# Patient Record
Sex: MALE | Race: WHITE | Employment: UNEMPLOYED | URBAN - METROPOLITAN AREA
[De-identification: names, ages, dates, MRNs, and addresses within clinical notes are randomized per-mention and may not be internally consistent; named-entity substitution may affect disease eponyms.]

---

## 2017-01-30 RX ORDER — ATORVASTATIN CALCIUM 40 MG/1
TABLET, FILM COATED ORAL
Qty: 30 TABLET | Refills: 7 | Status: SHIPPED | OUTPATIENT
Start: 2017-01-30 | End: 2017-07-22

## 2017-05-06 RX ORDER — METFORMIN HYDROCHLORIDE 500 MG/1
TABLET, EXTENDED RELEASE ORAL
Qty: 30 TABLET | Refills: 0 | Status: SHIPPED | OUTPATIENT
Start: 2017-05-06 | End: 2017-06-03

## 2017-05-06 NOTE — TELEPHONE ENCOUNTER
Diabetes Medications: 30 days supply of medication sent to pharmacy per protocol. Due for OV and labs.     Protocol Criteria:  · Appointment scheduled in the past 6 months or the next 3 months  · A1C < 7.5 in the past 6 months  · Creatinine in the past 12

## 2017-05-06 NOTE — TELEPHONE ENCOUNTER
Spouse calling to check status of refills request   This should be sent to Dr Mechelle Sorensen   Pt out of meds  Please advise

## 2017-05-09 ENCOUNTER — OFFICE VISIT (OUTPATIENT)
Dept: INTERNAL MEDICINE CLINIC | Facility: CLINIC | Age: 47
End: 2017-05-09

## 2017-05-09 ENCOUNTER — LAB ENCOUNTER (OUTPATIENT)
Dept: LAB | Age: 47
End: 2017-05-09
Attending: INTERNAL MEDICINE
Payer: COMMERCIAL

## 2017-05-09 VITALS
DIASTOLIC BLOOD PRESSURE: 109 MMHG | HEART RATE: 87 BPM | BODY MASS INDEX: 23.37 KG/M2 | SYSTOLIC BLOOD PRESSURE: 151 MMHG | TEMPERATURE: 97 F | WEIGHT: 142 LBS | HEIGHT: 65.5 IN

## 2017-05-09 DIAGNOSIS — R10.32 LEFT LOWER QUADRANT PAIN: ICD-10-CM

## 2017-05-09 DIAGNOSIS — R10.32 LEFT LOWER QUADRANT PAIN: Primary | ICD-10-CM

## 2017-05-09 PROCEDURE — 36415 COLL VENOUS BLD VENIPUNCTURE: CPT

## 2017-05-09 PROCEDURE — 99213 OFFICE O/P EST LOW 20 MIN: CPT | Performed by: INTERNAL MEDICINE

## 2017-05-09 PROCEDURE — 81015 MICROSCOPIC EXAM OF URINE: CPT

## 2017-05-09 PROCEDURE — 99212 OFFICE O/P EST SF 10 MIN: CPT | Performed by: INTERNAL MEDICINE

## 2017-05-09 PROCEDURE — 85025 COMPLETE CBC W/AUTO DIFF WBC: CPT

## 2017-05-09 RX ORDER — AMOXICILLIN AND CLAVULANATE POTASSIUM 500; 125 MG/1; MG/1
1 TABLET, FILM COATED ORAL
Qty: 20 TABLET | Refills: 0 | Status: SHIPPED | OUTPATIENT
Start: 2017-05-09 | End: 2017-06-03

## 2017-05-09 NOTE — PROGRESS NOTES
llq pain x 5 days worse if he eats  Denies fever  No bm x 5 d  Some dysuria  Feels like when he was hospitalized for diverticulitis in the same area years ago but not as severe  Blood pressure 151/109, pulse 87, temperature 97.4 °F (36.3 °C), temperature s

## 2017-05-11 ENCOUNTER — TELEPHONE (OUTPATIENT)
Dept: INTERNAL MEDICINE CLINIC | Facility: CLINIC | Age: 47
End: 2017-05-11

## 2017-05-11 NOTE — TELEPHONE ENCOUNTER
Pt calling and was seen in office on 05/09 and was dx with diverticulitis and pt has been having on and off headaches and would like to know if he can take tylenol?-please advise

## 2017-05-11 NOTE — TELEPHONE ENCOUNTER
Pt stated he was only drinking 2 Ensures a day plus taking RX Metformin, advised /educated on Foods he could eat during the Flare Up,advised he may have hungry headache with small food IT and not monitoring BS while taking Metformin    Asking why should he

## 2017-05-12 NOTE — TELEPHONE ENCOUNTER
Please note/advise. Thank you. Pt contacted and MD message below relayed to pt. Pt informed to hold the Metformin until next OV but pt did not know when he should F/U?     Pt states that he has been drinking the Ensure TID and eating small amounts of

## 2017-05-12 NOTE — TELEPHONE ENCOUNTER
Pt contacted and OV appt made for 6/3/17 at 9:20 am.    Pt instructed to call the office sooner if he has further questions or concerns.

## 2017-06-03 ENCOUNTER — OFFICE VISIT (OUTPATIENT)
Dept: INTERNAL MEDICINE CLINIC | Facility: CLINIC | Age: 47
End: 2017-06-03

## 2017-06-03 VITALS
HEIGHT: 65.5 IN | RESPIRATION RATE: 20 BRPM | BODY MASS INDEX: 23.37 KG/M2 | HEART RATE: 75 BPM | SYSTOLIC BLOOD PRESSURE: 139 MMHG | DIASTOLIC BLOOD PRESSURE: 89 MMHG | TEMPERATURE: 98 F | WEIGHT: 142 LBS

## 2017-06-03 DIAGNOSIS — K59.09 OTHER CONSTIPATION: Primary | ICD-10-CM

## 2017-06-03 PROCEDURE — 99213 OFFICE O/P EST LOW 20 MIN: CPT | Performed by: INTERNAL MEDICINE

## 2017-06-03 PROCEDURE — 99212 OFFICE O/P EST SF 10 MIN: CPT | Performed by: INTERNAL MEDICINE

## 2017-06-03 NOTE — PROGRESS NOTES
Resolved last episode   Only moves bowells every 4 days gets   Blood pressure 139/89, pulse 75, temperature 97.5 °F (36.4 °C), temperature source Oral, resp. rate 20, height 5' 5.5\" (1.664 m), weight 142 lb (64.411 kg).     HEENT-NC/AT [facility], eom intact,

## 2017-06-05 ENCOUNTER — TELEPHONE (OUTPATIENT)
Dept: INTERNAL MEDICINE CLINIC | Facility: CLINIC | Age: 47
End: 2017-06-05

## 2017-06-05 NOTE — TELEPHONE ENCOUNTER
Pt's wife states  is not available until July 25th.   States pt was scheduled with CASSIDY Jasmine, is asking for new referral. Please advise

## 2017-06-05 NOTE — TELEPHONE ENCOUNTER
I spoke with pt who had his wife make appt for him. Didn't know if wife asked for any other alternative GI MD availability. He wants his wife to call nurse back to provide his information. No PHI in chart and discussed this with pt.  He will get PHI complet

## 2017-06-05 NOTE — TELEPHONE ENCOUNTER
Pt's spouse states  wont get in until July 5th to see Gastroenterology. Pt's spouse is asking if that is okay to wait this long? Please advise.

## 2017-06-05 NOTE — TELEPHONE ENCOUNTER
Appointment changed to July 5th 2017 at 1:00. Patient needs a copy of the referral with the name of the doctor changed to the correct one.  Please advise.

## 2017-06-23 NOTE — TELEPHONE ENCOUNTER
Pt's spouse is calling to follow up on referral request.  Pt's spouse is asking to speak to nurse today, needs to know what's the status. Spouse states she requested this a few weeks ago.  Please advise

## 2017-07-05 ENCOUNTER — OFFICE VISIT (OUTPATIENT)
Dept: GASTROENTEROLOGY | Facility: CLINIC | Age: 47
End: 2017-07-05

## 2017-07-05 ENCOUNTER — LAB ENCOUNTER (OUTPATIENT)
Dept: LAB | Facility: HOSPITAL | Age: 47
End: 2017-07-05
Attending: PHYSICIAN ASSISTANT
Payer: COMMERCIAL

## 2017-07-05 ENCOUNTER — TELEPHONE (OUTPATIENT)
Dept: GASTROENTEROLOGY | Facility: CLINIC | Age: 47
End: 2017-07-05

## 2017-07-05 VITALS
HEART RATE: 92 BPM | HEIGHT: 65.5 IN | BODY MASS INDEX: 22.58 KG/M2 | WEIGHT: 137.19 LBS | SYSTOLIC BLOOD PRESSURE: 132 MMHG | DIASTOLIC BLOOD PRESSURE: 87 MMHG

## 2017-07-05 DIAGNOSIS — R19.4 CHANGE IN BOWEL HABITS: ICD-10-CM

## 2017-07-05 DIAGNOSIS — R10.32 LLQ PAIN: ICD-10-CM

## 2017-07-05 DIAGNOSIS — Z87.19 HX OF DIVERTICULITIS OF COLON: ICD-10-CM

## 2017-07-05 DIAGNOSIS — Z87.19 HX OF DIVERTICULITIS OF COLON: Primary | ICD-10-CM

## 2017-07-05 LAB
BASOPHILS # BLD: 0.1 K/UL (ref 0–0.2)
BASOPHILS NFR BLD: 1 %
EOSINOPHIL # BLD: 0 K/UL (ref 0–0.7)
EOSINOPHIL NFR BLD: 0 %
ERYTHROCYTE [DISTWIDTH] IN BLOOD BY AUTOMATED COUNT: 15.3 % (ref 11–15)
HCT VFR BLD AUTO: 50.9 % (ref 41–52)
HGB BLD-MCNC: 17 G/DL (ref 13.5–17.5)
LYMPHOCYTES # BLD: 1.8 K/UL (ref 1–4)
LYMPHOCYTES NFR BLD: 17 %
MCH RBC QN AUTO: 31.1 PG (ref 27–32)
MCHC RBC AUTO-ENTMCNC: 33.4 G/DL (ref 32–37)
MCV RBC AUTO: 93.2 FL (ref 80–100)
MONOCYTES # BLD: 0.8 K/UL (ref 0–1)
MONOCYTES NFR BLD: 7 %
NEUTROPHILS # BLD AUTO: 8.1 K/UL (ref 1.8–7.7)
NEUTROPHILS NFR BLD: 75 %
PLATELET # BLD AUTO: 234 K/UL (ref 140–400)
PMV BLD AUTO: 8.3 FL (ref 7.4–10.3)
RBC # BLD AUTO: 5.46 M/UL (ref 4.5–5.9)
WBC # BLD AUTO: 10.7 K/UL (ref 4–11)

## 2017-07-05 PROCEDURE — 99243 OFF/OP CNSLTJ NEW/EST LOW 30: CPT | Performed by: PHYSICIAN ASSISTANT

## 2017-07-05 PROCEDURE — 85025 COMPLETE CBC W/AUTO DIFF WBC: CPT

## 2017-07-05 PROCEDURE — 99212 OFFICE O/P EST SF 10 MIN: CPT | Performed by: PHYSICIAN ASSISTANT

## 2017-07-05 PROCEDURE — 36415 COLL VENOUS BLD VENIPUNCTURE: CPT

## 2017-07-05 NOTE — TELEPHONE ENCOUNTER
[address] [facility]    MR #: [id number]  : 3/1/1970  [facility]    PHYSICIAN: Mckenzie Ham M.D. Operative Report      DATE OF PROCEDURE:  2014      PREOPERATIVE DIAGNOSES: 1.  Gastroesophageal reflu a colonoscopy examination. Digital rectal examination was performed which showed no masses.  The Olympus pediatric video colonoscope was placed in the patient's rectum and advanced under direct visualization through the entire length of the colon up to the described.         Electronically Approved by:      Yuridia Davis M.D.

## 2017-07-05 NOTE — PATIENT INSTRUCTIONS
1. Lab.    2. Schedule the CT of the Abdomen/Pelvis. 3. Miralax twice a day - one capful in the AM, one capful in the PM.    4. If you spike a fever/chills, unrelenting abdominal pain, please proceed to the ED. 5. Try Tylenol over Excedrin.     6. Ple

## 2017-07-05 NOTE — H&P
[address] [facility] [address] Gastroenterology                                                                                                  Clinic History and Physical     Pa diverticulitis. Patient did not realize he could resume this after a short time of fiber-rest had passed. He does not believe fiber helped that much. Has not taken Miralax in over 1 month because it did not help him. Unsure if this was tried daily.  Admits negative for diplopia +glasses   RESPIRATORY:  negative for severe shortness of breath  CARDIOVASCULAR:  negative for crushing sub-sternal chest pain  GASTROINTESTINAL:  see HPI  GENITOURINARY:  negative for dysuria or gross hematuria  INTEGUMENT/BREAST: perforation January 2016. Had a PICC line for abx for +21 days. Took Augmentin BID for 7 days d/t LLQ pain early May 2017. No anemia, no leukocytosis on most recent labs. Presents today for change in bowel movements.     Unclear etiology of the patient's sy

## 2017-07-07 ENCOUNTER — TELEPHONE (OUTPATIENT)
Dept: GASTROENTEROLOGY | Facility: CLINIC | Age: 47
End: 2017-07-07

## 2017-07-07 NOTE — TELEPHONE ENCOUNTER
Uncertain significance of mildly increased neutrophil count and absence of overt leukocytosis. No anemia seen on most recent labs. Patient should proceed with CT of the abdomen and pelvis per office recommendations.   The timing of a colonoscopy is pendin

## 2017-07-07 NOTE — TELEPHONE ENCOUNTER
She returned call and it was routed to me. I explained the results of the patient's labs and recommendation to proceed with CT.   Patient had questions regarding dark vera urine in the morning which lightens throughout the day and asks if this is normal.

## 2017-07-15 ENCOUNTER — HOSPITAL ENCOUNTER (OUTPATIENT)
Dept: CT IMAGING | Facility: HOSPITAL | Age: 47
Discharge: HOME | End: 2017-07-15
Attending: PHYSICIAN ASSISTANT
Payer: COMMERCIAL

## 2017-07-15 DIAGNOSIS — R19.4 CHANGE IN BOWEL HABITS: ICD-10-CM

## 2017-07-15 DIAGNOSIS — R10.32 LLQ PAIN: ICD-10-CM

## 2017-07-15 DIAGNOSIS — Z87.19 HX OF DIVERTICULITIS OF COLON: ICD-10-CM

## 2017-07-15 LAB — CREAT BLD-MCNC: 1.3 MG/DL (ref 0.5–1.5)

## 2017-07-15 PROCEDURE — 82565 ASSAY OF CREATININE: CPT

## 2017-07-15 PROCEDURE — 74177 CT ABD & PELVIS W/CONTRAST: CPT | Performed by: PHYSICIAN ASSISTANT

## 2017-07-17 ENCOUNTER — TELEPHONE (OUTPATIENT)
Dept: GASTROENTEROLOGY | Facility: CLINIC | Age: 47
End: 2017-07-17

## 2017-07-17 NOTE — TELEPHONE ENCOUNTER
Results of CT were explained to patient. No acute findings. Still with alternating bowel habits, however more diarrhea - was taking Miralax even with looser stools (had loose stools after the contrast).  BMs are usually constipated - when he goes, generally

## 2017-07-22 ENCOUNTER — OFFICE VISIT (OUTPATIENT)
Dept: INTERNAL MEDICINE CLINIC | Facility: CLINIC | Age: 47
End: 2017-07-22

## 2017-07-22 VITALS
WEIGHT: 134 LBS | RESPIRATION RATE: 16 BRPM | BODY MASS INDEX: 22.33 KG/M2 | HEIGHT: 65 IN | HEART RATE: 85 BPM | DIASTOLIC BLOOD PRESSURE: 74 MMHG | SYSTOLIC BLOOD PRESSURE: 124 MMHG

## 2017-07-22 DIAGNOSIS — E78.00 HYPERCHOLESTEROLEMIA: Primary | Chronic | ICD-10-CM

## 2017-07-22 PROCEDURE — 99213 OFFICE O/P EST LOW 20 MIN: CPT | Performed by: INTERNAL MEDICINE

## 2017-07-22 PROCEDURE — 99212 OFFICE O/P EST SF 10 MIN: CPT | Performed by: INTERNAL MEDICINE

## 2017-07-22 RX ORDER — ATORVASTATIN CALCIUM 40 MG/1
TABLET, FILM COATED ORAL
Qty: 30 TABLET | Refills: 5 | Status: SHIPPED | OUTPATIENT
Start: 2017-07-22 | End: 2017-12-28

## 2017-07-22 NOTE — PROGRESS NOTES
Jayne De La Rosa is a 49year old male who presents this morning to establish ongoing primary care, requesting a refill of atorvastatin. HPI:   Previous PCP was Dr. Maynor Jensen. Previous history of prediabetes, previously treated with metformin.   Also histor status: Current Every Day Smoker                                                   Packs/day: 1.00      Years: 30.00        Types: Cigarettes  Comment: Pt state that he is ready to quit  Alcohol use:  No                    EXAM:   GENERAL: Pleasant male nany

## 2017-12-09 ENCOUNTER — TELEPHONE (OUTPATIENT)
Dept: INTERNAL MEDICINE CLINIC | Facility: CLINIC | Age: 47
End: 2017-12-09

## 2017-12-09 NOTE — TELEPHONE ENCOUNTER
I am already overbooked for physicals that day. Limit is 4 per day and that day has 5 physicals scheduled. He should schedule another day in a designated physical slot. He had an appointment for a physical scheduled for this Monday but canceled.

## 2017-12-09 NOTE — TELEPHONE ENCOUNTER
Pt's wife called in requesting to have Pt come in for a px with Dr. Surya Wiggins on 12/18 between 4-6pm.  Pt's wife has a 6:20pm appt on 12/18 with Dr. Surya Wiggins and would prefer for her  to be seen before her if possible.

## 2017-12-28 ENCOUNTER — OFFICE VISIT (OUTPATIENT)
Dept: FAMILY MEDICINE CLINIC | Facility: CLINIC | Age: 47
End: 2017-12-28

## 2017-12-28 VITALS
TEMPERATURE: 98 F | DIASTOLIC BLOOD PRESSURE: 92 MMHG | RESPIRATION RATE: 18 BRPM | HEART RATE: 66 BPM | BODY MASS INDEX: 24.32 KG/M2 | WEIGHT: 146 LBS | SYSTOLIC BLOOD PRESSURE: 149 MMHG | HEIGHT: 65 IN

## 2017-12-28 DIAGNOSIS — F17.200 SMOKING: ICD-10-CM

## 2017-12-28 DIAGNOSIS — Z00.00 ROUTINE PHYSICAL EXAMINATION: ICD-10-CM

## 2017-12-28 DIAGNOSIS — E78.5 HYPERLIPIDEMIA, UNSPECIFIED HYPERLIPIDEMIA TYPE: ICD-10-CM

## 2017-12-28 PROCEDURE — 99396 PREV VISIT EST AGE 40-64: CPT | Performed by: FAMILY MEDICINE

## 2017-12-28 RX ORDER — ATORVASTATIN CALCIUM 40 MG/1
TABLET, FILM COATED ORAL
Qty: 30 TABLET | Refills: 11 | Status: SHIPPED | OUTPATIENT
Start: 2017-12-28 | End: 2019-05-08

## 2017-12-28 RX ORDER — BUPROPION HYDROCHLORIDE 150 MG/1
150 TABLET, EXTENDED RELEASE ORAL 2 TIMES DAILY
Qty: 60 TABLET | Refills: 0 | Status: SHIPPED | OUTPATIENT
Start: 2017-12-28 | End: 2019-06-29

## 2017-12-28 NOTE — PROGRESS NOTES
HPI:    Patient ID: Beatriz Ortiz is a 49year old male. Patient is here for routine physical exam. No acute issues. Patient is requesting testing. Diet and exercise have been good.  Past medical history, family history, and social history were reviewe adenopathy. Neurological: He is alert. He has normal reflexes. Skin: No rash noted. Psychiatric: He has a normal mood and affect. His behavior is normal. Judgment and thought content normal.   Vitals reviewed.              ASSESSMENT/PLAN:   Routine p

## 2017-12-29 ENCOUNTER — APPOINTMENT (OUTPATIENT)
Dept: LAB | Age: 47
End: 2017-12-29
Attending: FAMILY MEDICINE
Payer: COMMERCIAL

## 2017-12-29 DIAGNOSIS — Z00.00 ROUTINE PHYSICAL EXAMINATION: ICD-10-CM

## 2017-12-29 LAB
ERYTHROCYTE [DISTWIDTH] IN BLOOD BY AUTOMATED COUNT: 14.8 % (ref 11–15)
HCT VFR BLD AUTO: 49.7 % (ref 41–52)
HGB BLD-MCNC: 16.5 G/DL (ref 13.5–17.5)
MCH RBC QN AUTO: 30.6 PG (ref 27–32)
MCHC RBC AUTO-ENTMCNC: 33.2 G/DL (ref 32–37)
MCV RBC AUTO: 92.2 FL (ref 80–100)
PLATELET # BLD AUTO: 217 K/UL (ref 140–400)
PMV BLD AUTO: 8.4 FL (ref 7.4–10.3)
RBC # BLD AUTO: 5.39 M/UL (ref 4.5–5.9)
WBC # BLD AUTO: 8.6 K/UL (ref 4–11)

## 2017-12-29 PROCEDURE — 36415 COLL VENOUS BLD VENIPUNCTURE: CPT

## 2017-12-29 PROCEDURE — 85027 COMPLETE CBC AUTOMATED: CPT

## 2017-12-29 PROCEDURE — 80061 LIPID PANEL: CPT | Performed by: FAMILY MEDICINE

## 2017-12-29 PROCEDURE — 80053 COMPREHEN METABOLIC PANEL: CPT | Performed by: FAMILY MEDICINE

## 2019-05-08 NOTE — TELEPHONE ENCOUNTER
pts wife called requesting refill for two month     Current Outpatient Medications:  atorvastatin 40 MG Oral Tab TAKE ONE TABLET BY MOUTH AT NIGHT Disp: 30 tablet Rfl: 11

## 2019-05-10 RX ORDER — ATORVASTATIN CALCIUM 40 MG/1
TABLET, FILM COATED ORAL
Qty: 90 TABLET | Refills: 0 | Status: SHIPPED | OUTPATIENT
Start: 2019-05-10 | End: 2019-10-14

## 2019-05-10 NOTE — TELEPHONE ENCOUNTER
Please review; protocol failed.     Requested Prescriptions     Pending Prescriptions Disp Refills   • atorvastatin 40 MG Oral Tab 60 tablet 11     Sig: TAKE ONE TABLET BY MOUTH AT NIGHT         Recent Visits  Date Type Provider Dept   12/28/17 Office Visit

## 2019-06-29 ENCOUNTER — APPOINTMENT (OUTPATIENT)
Dept: LAB | Age: 49
End: 2019-06-29
Attending: FAMILY MEDICINE
Payer: COMMERCIAL

## 2019-06-29 ENCOUNTER — OFFICE VISIT (OUTPATIENT)
Dept: FAMILY MEDICINE CLINIC | Facility: CLINIC | Age: 49
End: 2019-06-29

## 2019-06-29 VITALS
HEART RATE: 75 BPM | HEIGHT: 65 IN | WEIGHT: 132 LBS | BODY MASS INDEX: 21.99 KG/M2 | SYSTOLIC BLOOD PRESSURE: 126 MMHG | TEMPERATURE: 98 F | DIASTOLIC BLOOD PRESSURE: 86 MMHG

## 2019-06-29 DIAGNOSIS — Z00.00 ROUTINE PHYSICAL EXAMINATION: ICD-10-CM

## 2019-06-29 DIAGNOSIS — E78.5 HYPERLIPIDEMIA, UNSPECIFIED HYPERLIPIDEMIA TYPE: Primary | ICD-10-CM

## 2019-06-29 LAB — COMPLEXED PSA SERPL-MCNC: 1.57 NG/ML (ref ?–4)

## 2019-06-29 PROCEDURE — 99396 PREV VISIT EST AGE 40-64: CPT | Performed by: FAMILY MEDICINE

## 2019-06-29 PROCEDURE — 85027 COMPLETE CBC AUTOMATED: CPT | Performed by: FAMILY MEDICINE

## 2019-06-29 PROCEDURE — 80053 COMPREHEN METABOLIC PANEL: CPT | Performed by: FAMILY MEDICINE

## 2019-06-29 PROCEDURE — 80061 LIPID PANEL: CPT | Performed by: FAMILY MEDICINE

## 2019-06-29 PROCEDURE — 36415 COLL VENOUS BLD VENIPUNCTURE: CPT | Performed by: FAMILY MEDICINE

## 2019-06-29 NOTE — PROGRESS NOTES
HPI:    Patient ID: Riccardo Zabala is a 47year old male. Patient is here for routine physical exam and follow up on chronic medical issues. No acute issues. Patient is requesting blood testing. Diet and exercise have been good.  Past medical history, f sounds normal.   Abdominal: Soft. Bowel sounds are normal. He exhibits no distension. There is no hepatosplenomegaly. There is no tenderness. There is no rebound. Musculoskeletal: Normal range of motion.    Lymphadenopathy:     He has no cervical adenopat

## 2019-10-07 ENCOUNTER — TELEPHONE (OUTPATIENT)
Dept: GASTROENTEROLOGY | Facility: CLINIC | Age: 49
End: 2019-10-07

## 2019-10-07 NOTE — TELEPHONE ENCOUNTER
----- Message from Mera Segal RN sent at 8/27/2015  3:23 PM CDT -----  Regarding: Recall Colon  Recall colon in 5 years per CB.  Colon done 11/6/14

## 2019-10-14 RX ORDER — ATORVASTATIN CALCIUM 40 MG/1
TABLET, FILM COATED ORAL
Qty: 90 TABLET | Refills: 1 | Status: SHIPPED | OUTPATIENT
Start: 2019-10-14 | End: 2020-12-14

## 2019-10-14 NOTE — TELEPHONE ENCOUNTER
Refill passed per [facility] protocol.     Requested Prescriptions   Pending Prescriptions Disp Refills   • atorvastatin 40 MG Oral Tab 90 tablet 0     Sig: TAKE ONE TABLET BY MOUTH AT NIGHT       Cholesterol Medication Protocol Passed - 10/14/2019 12:

## 2020-12-08 ENCOUNTER — OFFICE VISIT (OUTPATIENT)
Dept: FAMILY MEDICINE CLINIC | Facility: CLINIC | Age: 50
End: 2020-12-08
Payer: OTHER MISCELLANEOUS

## 2020-12-08 ENCOUNTER — NURSE TRIAGE (OUTPATIENT)
Dept: FAMILY MEDICINE CLINIC | Facility: CLINIC | Age: 50
End: 2020-12-08

## 2020-12-08 VITALS
WEIGHT: 133 LBS | SYSTOLIC BLOOD PRESSURE: 154 MMHG | HEART RATE: 80 BPM | DIASTOLIC BLOOD PRESSURE: 93 MMHG | BODY MASS INDEX: 22.16 KG/M2 | HEIGHT: 65 IN

## 2020-12-08 DIAGNOSIS — K40.90 RIGHT INGUINAL HERNIA: Primary | ICD-10-CM

## 2020-12-08 PROCEDURE — 3077F SYST BP >= 140 MM HG: CPT | Performed by: FAMILY MEDICINE

## 2020-12-08 PROCEDURE — 3080F DIAST BP >= 90 MM HG: CPT | Performed by: FAMILY MEDICINE

## 2020-12-08 PROCEDURE — 99213 OFFICE O/P EST LOW 20 MIN: CPT | Performed by: FAMILY MEDICINE

## 2020-12-08 PROCEDURE — 3008F BODY MASS INDEX DOCD: CPT | Performed by: FAMILY MEDICINE

## 2020-12-08 NOTE — PROGRESS NOTES
HPI:    Patient ID: Heike Wilburn is a 45year old male. HPI  Patient presents with:  Hernia: RT side possible hurt hernia at work last night ,  Sudden onset discomfort and bulging in the right inguinal area.   Started last night when he was lifting a

## 2020-12-08 NOTE — TELEPHONE ENCOUNTER
Action Requested: Summary for Provider     []  Critical Lab, Recommendations Needed  [] Need Additional Advice  []   FYI    []   Need Orders  [] Need Medications Sent to Pharmacy  []  Other     SUMMARY: pt's wife (with pt in the background explains that th

## 2020-12-11 ENCOUNTER — TELEPHONE (OUTPATIENT)
Dept: FAMILY MEDICINE CLINIC | Facility: CLINIC | Age: 50
End: 2020-12-11

## 2020-12-11 ENCOUNTER — OFFICE VISIT (OUTPATIENT)
Dept: SURGERY | Facility: CLINIC | Age: 50
End: 2020-12-11
Payer: OTHER MISCELLANEOUS

## 2020-12-11 ENCOUNTER — TELEPHONE (OUTPATIENT)
Dept: SURGERY | Facility: CLINIC | Age: 50
End: 2020-12-11

## 2020-12-11 VITALS — BODY MASS INDEX: 22 KG/M2 | WEIGHT: 133 LBS

## 2020-12-11 DIAGNOSIS — K40.90 NON-RECURRENT UNILATERAL INGUINAL HERNIA WITHOUT OBSTRUCTION OR GANGRENE: Primary | ICD-10-CM

## 2020-12-11 PROCEDURE — 99212 OFFICE O/P EST SF 10 MIN: CPT | Performed by: SURGERY

## 2020-12-11 PROCEDURE — 99244 OFF/OP CNSLTJ NEW/EST MOD 40: CPT | Performed by: SURGERY

## 2020-12-11 NOTE — TELEPHONE ENCOUNTER
Spoke with spouse Daly Puente (DACIA and  verified)--requesting office notes and letter from Dr. Micaela Delgado and Dr. Julianne Mckeon office's showing hernia/injury is work related to be picked today, if possible, \"to help expedite his surgery.  The workman's comp told

## 2020-12-11 NOTE — TELEPHONE ENCOUNTER
Per pt Wilfrid (worker's comp) needs the referral, office notes, and note stating when his surgery is and that he cannot go back to work. Please fax to [phone] attn: Tony Germain.  Thank you

## 2020-12-11 NOTE — H&P
History and Physical      Robbidivya Mueller is a 45year old male. HPI   Patient presents with:  Hernia: Right side hernia. Has a history of a left side hernia 10 years ago.   12/7/2020 was lifting heavy object at work, felt a \"pop\" and had severe luisito normocephalic  Nose/Mouth/Throat: nose and throat are clear palate is intact mucous membranes are moist no oral lesions are noted  Neck/Thyroid: neck is supple without adenopathy  Respiratory: normal to inspection lungs are clear to auscultation bilaterall mass.  Pelvic organs appropriate for patient age. BONES:             No significant bony lesion or fracture. LUNG BASES:  No visible pulmonary or pleural disease. OTHER:             Negative.     Dictated by (CST): Saul Lamb MD on 7/15/2017  =====

## 2020-12-12 ENCOUNTER — TELEPHONE (OUTPATIENT)
Dept: FAMILY MEDICINE CLINIC | Facility: CLINIC | Age: 50
End: 2020-12-12

## 2020-12-12 NOTE — TELEPHONE ENCOUNTER
Patient is requesting a refill.     Current Outpatient Medications   Medication Sig Dispense Refill   • atorvastatin 40 MG Oral Tab TAKE ONE TABLET BY MOUTH AT NIGHT 90 tablet 1

## 2020-12-14 RX ORDER — ATORVASTATIN CALCIUM 40 MG/1
TABLET, FILM COATED ORAL
Qty: 90 TABLET | Refills: 0 | Status: SHIPPED | OUTPATIENT
Start: 2020-12-14

## 2020-12-28 ENCOUNTER — TELEPHONE (OUTPATIENT)
Dept: SURGERY | Facility: CLINIC | Age: 50
End: 2020-12-28

## 2020-12-30 RX ORDER — ACETAMINOPHEN 500 MG
500 TABLET ORAL EVERY 6 HOURS PRN
COMMUNITY

## 2021-01-02 ENCOUNTER — LAB ENCOUNTER (OUTPATIENT)
Dept: LAB | Age: 51
End: 2021-01-02
Attending: SURGERY
Payer: COMMERCIAL

## 2021-01-02 DIAGNOSIS — Z01.818 PREOP TESTING: ICD-10-CM

## 2021-01-03 LAB — SARS-COV-2 RNA RESP QL NAA+PROBE: DETECTED

## 2021-01-04 ENCOUNTER — TELEPHONE (OUTPATIENT)
Dept: FAMILY MEDICINE CLINIC | Facility: CLINIC | Age: 51
End: 2021-01-04

## 2021-01-04 ENCOUNTER — TELEPHONE (OUTPATIENT)
Dept: SURGERY | Facility: CLINIC | Age: 51
End: 2021-01-04

## 2021-01-04 DIAGNOSIS — K40.90 INGUINAL HERNIA OF RIGHT SIDE WITHOUT OBSTRUCTION OR GANGRENE: Primary | ICD-10-CM

## 2021-01-04 NOTE — TELEPHONE ENCOUNTER
Pt calling to see when he should schedule a f/u appointment for clearance, and repeat COVID test, states surgery rescheduled for 2/2/21. Hypothyroidism

## 2021-01-04 NOTE — TELEPHONE ENCOUNTER
Patient states he was scheduled for hernia surgery tomorrow. Patient was told to contact his PCP for further instructions. Patient tested positive for COVID-19 on Saturday 1/2/20 and currently has no symptoms. Patient states he is under worker's comp.

## 2021-01-04 NOTE — TELEPHONE ENCOUNTER
Chart reviewed and will need to wait at least 4-6 weeks as per DR Orlando's notes to reschedule his surgery. Will need repeat COVID testing again prior to surgery as before. If patient has symptoms, can make virtual appt.

## 2021-01-04 NOTE — TELEPHONE ENCOUNTER
Amanda Ceja,  Dr. Beryl Rodriguez would like patient medically cleared for rescheduled surgery due to covid. Per protocol asymptomatic patient needs to wait 4 weeks to reschedule, and he has been rescheduled for 2/2/2021.  Patient will require repeat testing, but [facility]

## 2021-01-04 NOTE — TELEPHONE ENCOUNTER
L/M for patient that surgery needs to be rescheduled due to positive covid test. Please call the office to reschedule surgery for 4-6 weeks, and see PCP for medical clearance per protocol.

## 2021-01-04 NOTE — TELEPHONE ENCOUNTER
Spoke with pt,  verified. Pt was informed of Dr Dannielle Hernandez RN  recommendation, pt stated he left message to Dr Dannielle Hernandez RN and waiting for further instruction. Pt was positive covid on 21, he will call back with an update. .      No future appointment

## 2021-01-04 NOTE — TELEPHONE ENCOUNTER
As far as repeat testing not needed now but will require repeat COVID test prior to surgery - usually about 72 hours prior to procedure and this will be schedule by presurgical department.

## 2021-01-04 NOTE — TELEPHONE ENCOUNTER
Spoke with pt,  verified  Pt informed of MD recommendation, pt stated understanding. No future appointments.

## 2021-01-06 ENCOUNTER — TELEPHONE (OUTPATIENT)
Dept: SURGERY | Facility: CLINIC | Age: 51
End: 2021-01-06

## 2021-01-06 NOTE — TELEPHONE ENCOUNTER
I call , spoke to [address], explained surgery has been rescheduled to 2/2/2021 d/t positive covid test. She voices understanding.

## 2021-01-06 NOTE — TELEPHONE ENCOUNTER
needs to confirm if the pt. Has had his surgery yesterday on 1/5/2021, and find out if the pt has sched his post op appt. , and need Work Status letter. Please fax info to [facility] [employer] [phone].

## 2021-01-06 NOTE — TELEPHONE ENCOUNTER
I spoke to Tanika. Wants to know if pt would be able to return to work, light duty, after negative for covid, but before surgery scheduled for 21? Routed to Dr. Nicole Garner- please advise.

## 2021-01-07 NOTE — TELEPHONE ENCOUNTER
Harpreet Larsen MD                No restrictions from a hernia standpoint for return to work preop. Swetha Bronson would prefer they use the provider who gave him the restrictions in the first place, if not too much trouble.     Previous Messages       Letter sent to A

## 2021-01-12 ENCOUNTER — TELEPHONE (OUTPATIENT)
Dept: FAMILY MEDICINE CLINIC | Facility: CLINIC | Age: 51
End: 2021-01-12

## 2021-01-12 NOTE — TELEPHONE ENCOUNTER
Dr Zachery Fernandez see message below for detail he tested positive 1/2/2021.   Please advises  thx

## 2021-01-12 NOTE — TELEPHONE ENCOUNTER
Pt wanted to know when he can make an appt for a pre op for his surgery on 2/2. Pt stts no symptoms and as been home since 1/3. Pt wanted to know can he do a pre op after 1/16?  Please advise

## 2021-01-12 NOTE — TELEPHONE ENCOUNTER
LMTCB see message below from Dr. Garza Diss  Please schedule patient on 1/18/21 if , patient do not have any symptoms.

## 2021-01-12 NOTE — TELEPHONE ENCOUNTER
If pt patient has completed his quarantine and is asymptomatic can schedule preop. Would probably recommend 2 weeks so 1/18, Monday would be better as not here on 1/16.

## 2021-01-13 ENCOUNTER — TELEPHONE (OUTPATIENT)
Dept: SURGERY | Facility: CLINIC | Age: 51
End: 2021-01-13

## 2021-01-13 NOTE — TELEPHONE ENCOUNTER
Per pt he tested positive and was instucted by pcp to quarentine until 1/18. Pt was previously cleared to go back to work 1/18 and is asking if he can still  heavy objects before his surgery.  Please advise

## 2021-01-14 ENCOUNTER — TELEPHONE (OUTPATIENT)
Dept: FAMILY MEDICINE CLINIC | Facility: CLINIC | Age: 51
End: 2021-01-14

## 2021-01-14 NOTE — TELEPHONE ENCOUNTER
Pt scheduled his appt for his pre op clearance on 1-20-21 at 11:00. Per the patient he is not having any symptoms.

## 2021-01-14 NOTE — TELEPHONE ENCOUNTER
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 Advised him to talk with Dr. Ramon Rothman regarding any restrictions regarding his job.   Advised him if there were any problems to please call the office and we would try to

## 2021-01-14 NOTE — TELEPHONE ENCOUNTER
Per pt he has to quarentine until 1/16 and his surgery was rescheduled for 2/2. Per pt workers comp and  state his paperwork does not make sense and they may stop paying him.  Pt does not know if he should go to work 1/18 and if he will have no restr

## 2021-01-14 NOTE — TELEPHONE ENCOUNTER
PRE-OP DATA and Check List - GI:  Procedure: EGD (41038)  Diagnosis: anemia  Tentative Date: Routine (next available or patient preference)  Tentative Time: To be determined  Duration of Procedure: 30 min  Anesthesia: MAC      No preop needed     Do take metoprolol the morning of the procedure.      Do not take enalapril on preparation day and may resume medication after procedure.       Spoke with pt,  verified, pt stated he called Dr Pavithra Prince office today and spoke to a  Nurse and the Nurse advised him to call his PCP to get a letter with restriction. RN told him that Dr Brit Fuentes is not in the office.    Pt was seen by Dr Brit Fuentes on

## 2021-01-14 NOTE — TELEPHONE ENCOUNTER
Left detailed message on patient's VM (FYI) the patient may go back to work with no restrictions on 1/18/2021, and his surgery is scheduled for 2/2/2021.

## 2021-01-20 ENCOUNTER — OFFICE VISIT (OUTPATIENT)
Dept: FAMILY MEDICINE CLINIC | Facility: CLINIC | Age: 51
End: 2021-01-20
Payer: OTHER MISCELLANEOUS

## 2021-01-20 ENCOUNTER — TELEPHONE (OUTPATIENT)
Dept: FAMILY MEDICINE CLINIC | Facility: CLINIC | Age: 51
End: 2021-01-20

## 2021-01-20 VITALS
BODY MASS INDEX: 23.16 KG/M2 | RESPIRATION RATE: 17 BRPM | DIASTOLIC BLOOD PRESSURE: 80 MMHG | WEIGHT: 139 LBS | HEART RATE: 90 BPM | SYSTOLIC BLOOD PRESSURE: 136 MMHG | HEIGHT: 65 IN

## 2021-01-20 DIAGNOSIS — Z01.818 PREOPERATIVE GENERAL PHYSICAL EXAMINATION: ICD-10-CM

## 2021-01-20 DIAGNOSIS — Z86.16 HISTORY OF 2019 NOVEL CORONAVIRUS DISEASE (COVID-19): ICD-10-CM

## 2021-01-20 DIAGNOSIS — K40.90 RIGHT INGUINAL HERNIA: ICD-10-CM

## 2021-01-20 PROCEDURE — 3075F SYST BP GE 130 - 139MM HG: CPT | Performed by: FAMILY MEDICINE

## 2021-01-20 PROCEDURE — 99214 OFFICE O/P EST MOD 30 MIN: CPT | Performed by: FAMILY MEDICINE

## 2021-01-20 PROCEDURE — 3008F BODY MASS INDEX DOCD: CPT | Performed by: FAMILY MEDICINE

## 2021-01-20 PROCEDURE — 3079F DIAST BP 80-89 MM HG: CPT | Performed by: FAMILY MEDICINE

## 2021-01-20 NOTE — PROGRESS NOTES
HPI:    Patient ID: Danyelle Leon is a 45year old male.     Patient is here for for preoperative history and physical. The patient will be having hernia surgery for a hernia on right side with Dr. Tom Clark on 2/3/2021 at [place]. No acute issues or proble well-nourished. HENT:   Head: Normocephalic and atraumatic.    Right Ear: External ear normal.   Left Ear: External ear normal.   Nose: Nose normal.   Mouth/Throat: Oropharynx is clear and moist.   Eyes: Conjunctivae are normal.   Neck: Normal range of mo

## 2021-01-20 NOTE — TELEPHONE ENCOUNTER
Dr. Mcclellan Can office contacted and they stated pt does not need any labs. We can just fax progress notes. AZIZA Guzmán.

## 2021-02-02 ENCOUNTER — ANESTHESIA EVENT (OUTPATIENT)
Dept: SURGERY | Facility: HOSPITAL | Age: 51
End: 2021-02-02
Payer: OTHER MISCELLANEOUS

## 2021-02-02 ENCOUNTER — ANESTHESIA (OUTPATIENT)
Dept: SURGERY | Facility: HOSPITAL | Age: 51
End: 2021-02-02
Payer: OTHER MISCELLANEOUS

## 2021-02-02 ENCOUNTER — HOSPITAL ENCOUNTER (OUTPATIENT)
Facility: HOSPITAL | Age: 51
Setting detail: HOSPITAL OUTPATIENT SURGERY
Discharge: HOME | End: 2021-02-02
Attending: SURGERY | Admitting: SURGERY
Payer: OTHER MISCELLANEOUS

## 2021-02-02 VITALS
HEIGHT: 65 IN | DIASTOLIC BLOOD PRESSURE: 88 MMHG | HEART RATE: 71 BPM | OXYGEN SATURATION: 95 % | BODY MASS INDEX: 22.82 KG/M2 | RESPIRATION RATE: 15 BRPM | SYSTOLIC BLOOD PRESSURE: 133 MMHG | TEMPERATURE: 99 F | WEIGHT: 137 LBS

## 2021-02-02 DIAGNOSIS — K40.90 NON-RECURRENT UNILATERAL INGUINAL HERNIA WITHOUT OBSTRUCTION OR GANGRENE: ICD-10-CM

## 2021-02-02 DIAGNOSIS — Z01.818 PREOP TESTING: Primary | ICD-10-CM

## 2021-02-02 LAB
GLUCOSE BLDC GLUCOMTR-MCNC: 115 MG/DL (ref 70–99)
GLUCOSE BLDC GLUCOMTR-MCNC: 130 MG/DL (ref 70–99)

## 2021-02-02 PROCEDURE — 49505 PRP I/HERN INIT REDUC >5 YR: CPT | Performed by: SURGERY

## 2021-02-02 PROCEDURE — 0YU50JZ SUPPLEMENT RIGHT INGUINAL REGION WITH SYNTHETIC SUBSTITUTE, OPEN APPROACH: ICD-10-PCS | Performed by: SURGERY

## 2021-02-02 DEVICE — POLYPROPLYLENE NONABSORBABLE SYNTHETIC SURGICAL MESH
Type: IMPLANTABLE DEVICE | Site: GROIN | Status: FUNCTIONAL
Brand: PROLENE

## 2021-02-02 RX ORDER — HYDROMORPHONE HYDROCHLORIDE 1 MG/ML
0.6 INJECTION, SOLUTION INTRAMUSCULAR; INTRAVENOUS; SUBCUTANEOUS EVERY 5 MIN PRN
Status: DISCONTINUED | OUTPATIENT
Start: 2021-02-02 | End: 2021-02-02

## 2021-02-02 RX ORDER — BUPIVACAINE HYDROCHLORIDE AND EPINEPHRINE 2.5; 5 MG/ML; UG/ML
INJECTION, SOLUTION INFILTRATION; PERINEURAL AS NEEDED
Status: DISCONTINUED | OUTPATIENT
Start: 2021-02-02 | End: 2021-02-02 | Stop reason: HOSPADM

## 2021-02-02 RX ORDER — KETOROLAC TROMETHAMINE 30 MG/ML
INJECTION, SOLUTION INTRAMUSCULAR; INTRAVENOUS AS NEEDED
Status: DISCONTINUED | OUTPATIENT
Start: 2021-02-02 | End: 2021-02-02 | Stop reason: SURG

## 2021-02-02 RX ORDER — ONDANSETRON 2 MG/ML
INJECTION INTRAMUSCULAR; INTRAVENOUS AS NEEDED
Status: DISCONTINUED | OUTPATIENT
Start: 2021-02-02 | End: 2021-02-02 | Stop reason: SURG

## 2021-02-02 RX ORDER — ONDANSETRON 2 MG/ML
4 INJECTION INTRAMUSCULAR; INTRAVENOUS ONCE AS NEEDED
Status: DISCONTINUED | OUTPATIENT
Start: 2021-02-02 | End: 2021-02-02

## 2021-02-02 RX ORDER — SODIUM CHLORIDE, SODIUM LACTATE, POTASSIUM CHLORIDE, CALCIUM CHLORIDE 600; 310; 30; 20 MG/100ML; MG/100ML; MG/100ML; MG/100ML
INJECTION, SOLUTION INTRAVENOUS CONTINUOUS
Status: DISCONTINUED | OUTPATIENT
Start: 2021-02-02 | End: 2021-02-02

## 2021-02-02 RX ORDER — HYDROMORPHONE HYDROCHLORIDE 1 MG/ML
0.2 INJECTION, SOLUTION INTRAMUSCULAR; INTRAVENOUS; SUBCUTANEOUS EVERY 5 MIN PRN
Status: DISCONTINUED | OUTPATIENT
Start: 2021-02-02 | End: 2021-02-02

## 2021-02-02 RX ORDER — DEXTROSE MONOHYDRATE 25 G/50ML
50 INJECTION, SOLUTION INTRAVENOUS
Status: DISCONTINUED | OUTPATIENT
Start: 2021-02-02 | End: 2021-02-02

## 2021-02-02 RX ORDER — PROCHLORPERAZINE EDISYLATE 5 MG/ML
5 INJECTION INTRAMUSCULAR; INTRAVENOUS ONCE AS NEEDED
Status: DISCONTINUED | OUTPATIENT
Start: 2021-02-02 | End: 2021-02-02

## 2021-02-02 RX ORDER — MORPHINE SULFATE 4 MG/ML
4 INJECTION, SOLUTION INTRAMUSCULAR; INTRAVENOUS EVERY 10 MIN PRN
Status: DISCONTINUED | OUTPATIENT
Start: 2021-02-02 | End: 2021-02-02

## 2021-02-02 RX ORDER — HYDROCODONE BITARTRATE AND ACETAMINOPHEN 5; 325 MG/1; MG/1
2 TABLET ORAL AS NEEDED
Status: DISCONTINUED | OUTPATIENT
Start: 2021-02-02 | End: 2021-02-02

## 2021-02-02 RX ORDER — MORPHINE SULFATE 4 MG/ML
2 INJECTION, SOLUTION INTRAMUSCULAR; INTRAVENOUS EVERY 10 MIN PRN
Status: DISCONTINUED | OUTPATIENT
Start: 2021-02-02 | End: 2021-02-02

## 2021-02-02 RX ORDER — HYDROMORPHONE HYDROCHLORIDE 1 MG/ML
0.4 INJECTION, SOLUTION INTRAMUSCULAR; INTRAVENOUS; SUBCUTANEOUS EVERY 5 MIN PRN
Status: DISCONTINUED | OUTPATIENT
Start: 2021-02-02 | End: 2021-02-02

## 2021-02-02 RX ORDER — LIDOCAINE HYDROCHLORIDE 10 MG/ML
INJECTION, SOLUTION EPIDURAL; INFILTRATION; INTRACAUDAL; PERINEURAL AS NEEDED
Status: DISCONTINUED | OUTPATIENT
Start: 2021-02-02 | End: 2021-02-02 | Stop reason: SURG

## 2021-02-02 RX ORDER — HALOPERIDOL 5 MG/ML
0.25 INJECTION INTRAMUSCULAR ONCE AS NEEDED
Status: DISCONTINUED | OUTPATIENT
Start: 2021-02-02 | End: 2021-02-02

## 2021-02-02 RX ORDER — CEFAZOLIN SODIUM/WATER 2 G/20 ML
2 SYRINGE (ML) INTRAVENOUS ONCE
Status: COMPLETED | OUTPATIENT
Start: 2021-02-02 | End: 2021-02-02

## 2021-02-02 RX ORDER — HYDROCODONE BITARTRATE AND ACETAMINOPHEN 5; 325 MG/1; MG/1
1 TABLET ORAL EVERY 6 HOURS PRN
Qty: 20 TABLET | Refills: 0 | Status: SHIPPED | OUTPATIENT
Start: 2021-02-02 | End: 2021-11-18

## 2021-02-02 RX ORDER — NALOXONE HYDROCHLORIDE 0.4 MG/ML
80 INJECTION, SOLUTION INTRAMUSCULAR; INTRAVENOUS; SUBCUTANEOUS AS NEEDED
Status: DISCONTINUED | OUTPATIENT
Start: 2021-02-02 | End: 2021-02-02

## 2021-02-02 RX ORDER — HYDROCODONE BITARTRATE AND ACETAMINOPHEN 5; 325 MG/1; MG/1
1 TABLET ORAL AS NEEDED
Status: DISCONTINUED | OUTPATIENT
Start: 2021-02-02 | End: 2021-02-02

## 2021-02-02 RX ORDER — MIDAZOLAM HYDROCHLORIDE 1 MG/ML
INJECTION INTRAMUSCULAR; INTRAVENOUS AS NEEDED
Status: DISCONTINUED | OUTPATIENT
Start: 2021-02-02 | End: 2021-02-02 | Stop reason: SURG

## 2021-02-02 RX ORDER — MORPHINE SULFATE 10 MG/ML
6 INJECTION, SOLUTION INTRAMUSCULAR; INTRAVENOUS EVERY 10 MIN PRN
Status: DISCONTINUED | OUTPATIENT
Start: 2021-02-02 | End: 2021-02-02

## 2021-02-02 RX ORDER — DEXAMETHASONE SODIUM PHOSPHATE 4 MG/ML
VIAL (ML) INJECTION AS NEEDED
Status: DISCONTINUED | OUTPATIENT
Start: 2021-02-02 | End: 2021-02-02 | Stop reason: SURG

## 2021-02-02 RX ORDER — HYDROCODONE BITARTRATE AND ACETAMINOPHEN 5; 325 MG/1; MG/1
1 TABLET ORAL EVERY 6 HOURS PRN
Qty: 20 TABLET | Refills: 0 | Status: SHIPPED | OUTPATIENT
Start: 2021-02-02 | End: 2021-02-17 | Stop reason: ALTCHOICE

## 2021-02-02 RX ORDER — ACETAMINOPHEN 500 MG
1000 TABLET ORAL ONCE
Status: COMPLETED | OUTPATIENT
Start: 2021-02-02 | End: 2021-02-02

## 2021-02-02 RX ADMIN — CEFAZOLIN SODIUM/WATER 2 G: 2 G/20 ML SYRINGE (ML) INTRAVENOUS at 09:28:00

## 2021-02-02 RX ADMIN — SODIUM CHLORIDE, SODIUM LACTATE, POTASSIUM CHLORIDE, CALCIUM CHLORIDE: 600; 310; 30; 20 INJECTION, SOLUTION INTRAVENOUS at 10:32:00

## 2021-02-02 RX ADMIN — MIDAZOLAM HYDROCHLORIDE 2 MG: 1 INJECTION INTRAMUSCULAR; INTRAVENOUS at 09:16:00

## 2021-02-02 RX ADMIN — KETOROLAC TROMETHAMINE 30 MG: 30 INJECTION, SOLUTION INTRAMUSCULAR; INTRAVENOUS at 10:20:00

## 2021-02-02 RX ADMIN — DEXAMETHASONE SODIUM PHOSPHATE 4 MG: 4 MG/ML VIAL (ML) INJECTION at 09:27:00

## 2021-02-02 RX ADMIN — LIDOCAINE HYDROCHLORIDE 50 MG: 10 INJECTION, SOLUTION EPIDURAL; INFILTRATION; INTRACAUDAL; PERINEURAL at 09:20:00

## 2021-02-02 RX ADMIN — ONDANSETRON 4 MG: 2 INJECTION INTRAMUSCULAR; INTRAVENOUS at 09:27:00

## 2021-02-02 NOTE — ANESTHESIA POSTPROCEDURE EVALUATION
Patient: Jayne De La Rosa    Procedure Summary     Date: 02/02/21 Room / Location: [address] [place] [address] MAIN OR 05 / [address] [facility] MAIN OR    Anesthesia Start: [address] Anesthesia Stop: 0553    Procedure:  HERNIA INGUINAL REPAIR ADULT (Right ) Diagnosis:       Non-recurrent unilateral ing

## 2021-02-02 NOTE — ANESTHESIA PREPROCEDURE EVALUATION
Anesthesia PreOp Note    HPI:     Tete Han is a 45year old male who presents for preoperative consultation requested by: Teagan Voss MD    Date of Surgery: 2/2/2021    Procedure(s):   HERNIA INGUINAL REPAIR ADULT  Indication: Non-recurrent unilat Marital status:       Spouse name: Not on file      Number of children: 0      Years of education: Not on file      Highest education level: Not on file    Occupational History      Occupation: [employer]        Comment: [employer]    Social Not Asked        Self-Exams: Not Asked    Social History Narrative      Not on file      Available pre-op labs reviewed. Lab Results   Component Value Date    PGLU 115 (H) 02/02/2021          Vital Signs: Body mass index is 22.8 kg/m².    height is 1.6

## 2021-02-02 NOTE — OPERATIVE REPORT
[facility]                                              OPERATIVE REPORT - [id number]                        -----------------------------------------------------------------------  CSN: [id number] made along lines of skin tension. This was  extended into the subcutaneous tissue using electrocautery for  hemostasis.   The external oblique aponeurosis was divided along the  length of its fibers through the external ring and cord structures  isolated a

## 2021-02-02 NOTE — H&P
History and Physical      Andi Wu is a 45year old male. HPI   No chief complaint on file.       HPI  No L complaints - healed well, no fam hx hernia, lifts a lot at work, no GI or  changes, tylenol helps, bulge reducible, usually sees dr. Gardner Seen mucous membranes are moist no oral lesions are noted  Neck/Thyroid: neck is supple without adenopathy  Respiratory: normal to inspection lungs are clear to auscultation bilaterally normal respiratory effort  Cardiovascular: regular rate and rhythm no murmu significant bony lesion or fracture. LUNG BASES:  No visible pulmonary or pleural disease. OTHER:             Negative. Dictated by (CST): Dada Seth MD on 7/15/2017  =====  CONCLUSION:   1. Colonic diverticulosis without acute inflammation.   2.

## 2021-02-02 NOTE — INTERVAL H&P NOTE
Pre-op Diagnosis: Non-recurrent unilateral inguinal hernia without obstruction or gangrene [K40.90]    The above referenced H&P was reviewed by Braden Quintero MD on 2/2/2021, the patient was examined and no significant changes have occurred in the patient's

## 2021-02-17 ENCOUNTER — OFFICE VISIT (OUTPATIENT)
Dept: SURGERY | Facility: CLINIC | Age: 51
End: 2021-02-17

## 2021-02-17 DIAGNOSIS — K40.90 NON-RECURRENT UNILATERAL INGUINAL HERNIA WITHOUT OBSTRUCTION OR GANGRENE: Primary | ICD-10-CM

## 2021-02-17 PROCEDURE — 99024 POSTOP FOLLOW-UP VISIT: CPT | Performed by: SURGERY

## 2021-02-17 NOTE — PROGRESS NOTES
Postoperative Patient Follow-up      2/17/2021    [facility] 45year old      HPI  Patient presents with:  Post-Op: Pt here for 1st post op s/p open RIH repair on 2/2/2021. Pt c/o pain w/ increased activity & urination. Pt denies fever or dif.  w/ bm

## 2021-03-08 ENCOUNTER — TELEPHONE (OUTPATIENT)
Dept: SURGERY | Facility: CLINIC | Age: 51
End: 2021-03-08

## 2021-03-08 NOTE — TELEPHONE ENCOUNTER
Per pt work note given needs to be corrected per workers comp, states it is worded wrong, states he will explain further to RN. Please call thank you.

## 2021-03-08 NOTE — TELEPHONE ENCOUNTER
Per pt needs a new RTW note stating he is under Dr. Winston Mark care and \"cannot work until 3/15\". Pt asking to speak to rn.  Please advise

## 2021-08-08 ENCOUNTER — HOSPITAL ENCOUNTER (OUTPATIENT)
Age: 51
Discharge: HOME | End: 2021-08-08
Payer: COMMERCIAL

## 2021-08-08 VITALS
DIASTOLIC BLOOD PRESSURE: 101 MMHG | HEIGHT: 65 IN | TEMPERATURE: 100 F | RESPIRATION RATE: 18 BRPM | HEART RATE: 103 BPM | WEIGHT: 138 LBS | BODY MASS INDEX: 22.99 KG/M2 | OXYGEN SATURATION: 97 % | SYSTOLIC BLOOD PRESSURE: 150 MMHG

## 2021-08-08 DIAGNOSIS — K08.89 PAIN, DENTAL: Primary | ICD-10-CM

## 2021-08-08 PROCEDURE — 99203 OFFICE O/P NEW LOW 30 MIN: CPT | Performed by: PHYSICIAN ASSISTANT

## 2021-08-08 RX ORDER — HYDROCODONE BITARTRATE AND ACETAMINOPHEN 5; 325 MG/1; MG/1
1 TABLET ORAL ONCE
Status: COMPLETED | OUTPATIENT
Start: 2021-08-08 | End: 2021-08-08

## 2021-08-08 RX ORDER — PENICILLIN V POTASSIUM 500 MG/1
500 TABLET ORAL 4 TIMES DAILY
Qty: 40 TABLET | Refills: 0 | Status: SHIPPED | OUTPATIENT
Start: 2021-08-08 | End: 2021-08-08

## 2021-08-08 RX ORDER — HYDROCODONE BITARTRATE AND ACETAMINOPHEN 5; 325 MG/1; MG/1
1-2 TABLET ORAL EVERY 6 HOURS PRN
Qty: 10 TABLET | Refills: 0 | Status: SHIPPED | OUTPATIENT
Start: 2021-08-08 | End: 2021-08-15

## 2021-08-08 RX ORDER — PENICILLIN V POTASSIUM 500 MG/1
500 TABLET ORAL 4 TIMES DAILY
Qty: 40 TABLET | Refills: 0 | Status: SHIPPED | OUTPATIENT
Start: 2021-08-08 | End: 2021-08-18

## 2021-08-08 RX ORDER — HYDROCODONE BITARTRATE AND ACETAMINOPHEN 5; 325 MG/1; MG/1
1-2 TABLET ORAL EVERY 6 HOURS PRN
Qty: 10 TABLET | Refills: 0 | Status: SHIPPED | OUTPATIENT
Start: 2021-08-08 | End: 2021-08-08

## 2021-08-08 NOTE — ED PROVIDER NOTES
Patient Seen in: [facility]      History   Patient presents with:  Jaw Pain    Stated Complaint: jaw pain    HPI/Subjective:   HPI    44 yo male with PMH of HLD, tobacco use disorder here for evaluation of dental pain.  Pt states he dental luisito Head: Normocephalic and atraumatic.         Right Ear: External ear normal.      Left Ear: External ear normal.      Nose: Nose normal.      Mouth/Throat:      Mouth: Mucous membranes are moist.      Comments: Pt has an overall poor dentition with teeth in taking these medications    penicillin v potassium 500 MG Oral Tab  Take 1 tablet (500 mg total) by mouth 4 (four) times daily for 10 days.   Qty: 40 tablet Refills: 0    !! HYDROcodone-acetaminophen 5-325 MG Oral Tab  Take 1-2 tablets by mouth every 6 (six

## 2021-08-08 NOTE — ED INITIAL ASSESSMENT (HPI)
Has tteth pulled 6 months ago rt jaw area. For past 6 days swelling pain in mouth, rt ear and rt side of face.  Not vaccinated

## 2021-11-17 ENCOUNTER — NURSE TRIAGE (OUTPATIENT)
Dept: FAMILY MEDICINE CLINIC | Facility: CLINIC | Age: 51
End: 2021-11-17

## 2021-11-17 NOTE — TELEPHONE ENCOUNTER
Action Requested: Summary for Provider     []  Critical Lab, Recommendations Needed  [] Need Additional Advice  []   FYI    []   Need Orders  [] Need Medications Sent to Pharmacy  []  Other     SUMMARY: Per protocol advised : Office visit within 3 days

## 2021-11-18 ENCOUNTER — OFFICE VISIT (OUTPATIENT)
Dept: FAMILY MEDICINE CLINIC | Facility: CLINIC | Age: 51
End: 2021-11-18

## 2021-11-18 ENCOUNTER — TELEPHONE (OUTPATIENT)
Dept: FAMILY MEDICINE CLINIC | Facility: CLINIC | Age: 51
End: 2021-11-18

## 2021-11-18 VITALS
WEIGHT: 134 LBS | RESPIRATION RATE: 20 BRPM | HEIGHT: 65 IN | DIASTOLIC BLOOD PRESSURE: 98 MMHG | SYSTOLIC BLOOD PRESSURE: 164 MMHG | TEMPERATURE: 98 F | BODY MASS INDEX: 22.33 KG/M2 | HEART RATE: 102 BPM

## 2021-11-18 DIAGNOSIS — R22.0 SWELLING, CHEEK: Primary | ICD-10-CM

## 2021-11-18 DIAGNOSIS — K04.7 TOOTH INFECTION: ICD-10-CM

## 2021-11-18 PROCEDURE — 3008F BODY MASS INDEX DOCD: CPT | Performed by: FAMILY MEDICINE

## 2021-11-18 PROCEDURE — 3080F DIAST BP >= 90 MM HG: CPT | Performed by: FAMILY MEDICINE

## 2021-11-18 PROCEDURE — 3077F SYST BP >= 140 MM HG: CPT | Performed by: FAMILY MEDICINE

## 2021-11-18 PROCEDURE — 99213 OFFICE O/P EST LOW 20 MIN: CPT | Performed by: FAMILY MEDICINE

## 2021-11-18 RX ORDER — AMOXICILLIN AND CLAVULANATE POTASSIUM 875; 125 MG/1; MG/1
1 TABLET, FILM COATED ORAL 2 TIMES DAILY
Qty: 20 TABLET | Refills: 0 | Status: SHIPPED | OUTPATIENT
Start: 2021-11-18

## 2021-11-18 RX ORDER — AMOXICILLIN AND CLAVULANATE POTASSIUM 875; 125 MG/1; MG/1
1 TABLET, FILM COATED ORAL 2 TIMES DAILY
Qty: 20 TABLET | Refills: 0 | Status: SHIPPED | OUTPATIENT
Start: 2021-11-18 | End: 2021-11-18

## 2021-11-18 RX ORDER — AMOXICILLIN 500 MG/1
TABLET, FILM COATED ORAL
COMMUNITY
Start: 2021-11-11 | End: 2021-11-18

## 2021-11-18 RX ORDER — AMOXICILLIN 500 MG/1
CAPSULE ORAL
COMMUNITY
Start: 2021-11-06

## 2021-11-18 RX ORDER — IBUPROFEN 600 MG/1
TABLET ORAL
COMMUNITY
Start: 2021-11-10

## 2021-11-18 NOTE — PROGRESS NOTES
Subjective:   Patient ID: Riccardo Zabala is a 45year old male. Pt presents with hx of teeth infection and had them pulled a month ago. His dentist prescribed antibiotics for the infection. Pt now has had swelling of the right cheek area. No fevers. week if not resolved or as needed if worse. Consider follow up with ENT if not better or worse. No orders of the defined types were placed in this encounter.       Meds This Visit:  Requested Prescriptions     Signed Prescriptions Disp Refills   • amox

## 2021-11-27 ENCOUNTER — OFFICE VISIT (OUTPATIENT)
Dept: OTOLARYNGOLOGY | Facility: CLINIC | Age: 51
End: 2021-11-27

## 2021-11-27 DIAGNOSIS — K04.7 DENTAL INFECTION: Primary | ICD-10-CM

## 2021-11-27 PROCEDURE — 99243 OFF/OP CNSLTJ NEW/EST LOW 30: CPT | Performed by: OTOLARYNGOLOGY

## 2021-11-27 RX ORDER — METHYLPREDNISOLONE 4 MG/1
TABLET ORAL
Qty: 21 TABLET | Refills: 0 | Status: SHIPPED | OUTPATIENT
Start: 2021-11-27

## 2021-11-27 RX ORDER — CLINDAMYCIN HYDROCHLORIDE 300 MG/1
300 CAPSULE ORAL EVERY 8 HOURS
Qty: 21 CAPSULE | Refills: 0 | Status: SHIPPED | OUTPATIENT
Start: 2021-11-27 | End: 2021-12-04

## 2021-11-27 NOTE — PROGRESS NOTES
Scot Lynch is a 45year old male. Patient presents with:  Swelling: Swelling of right cheek, Herberth abx      HISTORY OF PRESENT ILLNESS  He presents with a history of having a dental infection of the maxillary molar or premolar on the right.   Saw the Negative Fatigue, fever and weight loss. ENMT Negative Drooling. Eyes Negative Blurred vision and vision changes. Respiratory Negative Dyspnea and wheezing. Cardio Negative Chest pain, irregular heartbeat/palpitations and syncope.    GI Negative Abd Normal Left: Normal. Septum -Normal  Turbinates - Right: Normal, Left: Normal.       Current Outpatient Medications:   •  clindamycin 300 MG Oral Cap, Take 1 capsule (300 mg total) by mouth every 8 (eight) hours for 7 days. , Disp: 21 capsule, Rfl: 0  •  me correct errors during dictation discrepancies may still exist    Cortez De La Torre MD    11/27/2021    9:08 AM

## 2023-01-25 ENCOUNTER — TELEPHONE (OUTPATIENT)
Dept: FAMILY MEDICINE CLINIC | Facility: CLINIC | Age: 53
End: 2023-01-25

## 2023-01-25 DIAGNOSIS — Z00.00 ROUTINE PHYSICAL EXAMINATION: Primary | ICD-10-CM

## 2023-01-25 NOTE — TELEPHONE ENCOUNTER
Patients wife called and is requesting Orders for Blood work to be placed prior to appointment scheduled 1-31    Please inform patient when the labs are ordered

## 2023-01-27 ENCOUNTER — LAB ENCOUNTER (OUTPATIENT)
Dept: LAB | Age: 53
End: 2023-01-27
Attending: FAMILY MEDICINE
Payer: COMMERCIAL

## 2023-01-27 DIAGNOSIS — Z00.00 ROUTINE PHYSICAL EXAMINATION: ICD-10-CM

## 2023-01-27 LAB
ALBUMIN SERPL-MCNC: 3.9 G/DL (ref 3.4–5)
ALBUMIN/GLOB SERPL: 1.2 {RATIO} (ref 1–2)
ALP LIVER SERPL-CCNC: 81 U/L
ALT SERPL-CCNC: 17 U/L
ANION GAP SERPL CALC-SCNC: 5 MMOL/L (ref 0–18)
AST SERPL-CCNC: 12 U/L (ref 15–37)
BILIRUB SERPL-MCNC: 0.5 MG/DL (ref 0.1–2)
BUN BLD-MCNC: 27 MG/DL (ref 7–18)
BUN/CREAT SERPL: 25 (ref 10–20)
CALCIUM BLD-MCNC: 9.2 MG/DL (ref 8.5–10.1)
CHLORIDE SERPL-SCNC: 107 MMOL/L (ref 98–112)
CHOLEST SERPL-MCNC: 264 MG/DL (ref ?–200)
CO2 SERPL-SCNC: 26 MMOL/L (ref 21–32)
COMPLEXED PSA SERPL-MCNC: 1.65 NG/ML (ref ?–4)
CREAT BLD-MCNC: 1.08 MG/DL
DEPRECATED RDW RBC AUTO: 51 FL (ref 35.1–46.3)
ERYTHROCYTE [DISTWIDTH] IN BLOOD BY AUTOMATED COUNT: 14.6 % (ref 11–15)
FASTING PATIENT LIPID ANSWER: YES
FASTING STATUS PATIENT QL REPORTED: YES
GFR SERPLBLD BASED ON 1.73 SQ M-ARVRAT: 82 ML/MIN/1.73M2 (ref 60–?)
GLOBULIN PLAS-MCNC: 3.3 G/DL (ref 2.8–4.4)
GLUCOSE BLD-MCNC: 110 MG/DL (ref 70–99)
HCT VFR BLD AUTO: 49.1 %
HDLC SERPL-MCNC: 46 MG/DL (ref 40–59)
HGB BLD-MCNC: 16 G/DL
LDLC SERPL CALC-MCNC: 186 MG/DL (ref ?–100)
MCH RBC QN AUTO: 30.6 PG (ref 26–34)
MCHC RBC AUTO-ENTMCNC: 32.6 G/DL (ref 31–37)
MCV RBC AUTO: 93.9 FL
NONHDLC SERPL-MCNC: 218 MG/DL (ref ?–130)
OSMOLALITY SERPL CALC.SUM OF ELEC: 292 MOSM/KG (ref 275–295)
PLATELET # BLD AUTO: 263 10(3)UL (ref 150–450)
POTASSIUM SERPL-SCNC: 4.2 MMOL/L (ref 3.5–5.1)
PROT SERPL-MCNC: 7.2 G/DL (ref 6.4–8.2)
RBC # BLD AUTO: 5.23 X10(6)UL
SODIUM SERPL-SCNC: 138 MMOL/L (ref 136–145)
TRIGL SERPL-MCNC: 170 MG/DL (ref 30–149)
VLDLC SERPL CALC-MCNC: 35 MG/DL (ref 0–30)
WBC # BLD AUTO: 9.6 X10(3) UL (ref 4–11)

## 2023-01-27 PROCEDURE — 85027 COMPLETE CBC AUTOMATED: CPT | Performed by: FAMILY MEDICINE

## 2023-01-27 PROCEDURE — 80053 COMPREHEN METABOLIC PANEL: CPT | Performed by: FAMILY MEDICINE

## 2023-01-27 PROCEDURE — 36415 COLL VENOUS BLD VENIPUNCTURE: CPT | Performed by: FAMILY MEDICINE

## 2023-01-27 PROCEDURE — 80061 LIPID PANEL: CPT | Performed by: FAMILY MEDICINE

## 2023-01-30 ENCOUNTER — TELEPHONE (OUTPATIENT)
Dept: FAMILY MEDICINE CLINIC | Facility: CLINIC | Age: 53
End: 2023-01-30

## 2023-01-31 ENCOUNTER — OFFICE VISIT (OUTPATIENT)
Dept: FAMILY MEDICINE CLINIC | Facility: CLINIC | Age: 53
End: 2023-01-31

## 2023-01-31 ENCOUNTER — TELEPHONE (OUTPATIENT)
Dept: FAMILY MEDICINE CLINIC | Facility: CLINIC | Age: 53
End: 2023-01-31

## 2023-01-31 VITALS
SYSTOLIC BLOOD PRESSURE: 124 MMHG | BODY MASS INDEX: 23.49 KG/M2 | RESPIRATION RATE: 16 BRPM | TEMPERATURE: 98 F | WEIGHT: 141 LBS | HEART RATE: 76 BPM | DIASTOLIC BLOOD PRESSURE: 76 MMHG | HEIGHT: 65 IN

## 2023-01-31 DIAGNOSIS — E78.5 HYPERLIPIDEMIA, UNSPECIFIED HYPERLIPIDEMIA TYPE: ICD-10-CM

## 2023-01-31 DIAGNOSIS — Z87.19 HISTORY OF DIVERTICULOSIS: ICD-10-CM

## 2023-01-31 DIAGNOSIS — Z12.11 COLON CANCER SCREENING: ICD-10-CM

## 2023-01-31 DIAGNOSIS — Z00.00 ROUTINE PHYSICAL EXAMINATION: Primary | ICD-10-CM

## 2023-01-31 PROCEDURE — 3008F BODY MASS INDEX DOCD: CPT | Performed by: FAMILY MEDICINE

## 2023-01-31 PROCEDURE — 3074F SYST BP LT 130 MM HG: CPT | Performed by: FAMILY MEDICINE

## 2023-01-31 PROCEDURE — 99396 PREV VISIT EST AGE 40-64: CPT | Performed by: FAMILY MEDICINE

## 2023-01-31 PROCEDURE — 3078F DIAST BP <80 MM HG: CPT | Performed by: FAMILY MEDICINE

## 2023-01-31 RX ORDER — ATORVASTATIN CALCIUM 40 MG/1
TABLET, FILM COATED ORAL
Qty: 90 TABLET | Refills: 3 | Status: SHIPPED | OUTPATIENT
Start: 2023-01-31

## 2024-03-13 ENCOUNTER — HOSPITAL ENCOUNTER (OUTPATIENT)
Age: 54
Discharge: HOME | End: 2024-03-13

## 2024-03-13 VITALS
HEART RATE: 92 BPM | RESPIRATION RATE: 16 BRPM | SYSTOLIC BLOOD PRESSURE: 148 MMHG | TEMPERATURE: 98 F | DIASTOLIC BLOOD PRESSURE: 86 MMHG | OXYGEN SATURATION: 96 %

## 2024-03-13 DIAGNOSIS — S65.510A LACERATION OF BLOOD VESSEL OF RIGHT INDEX FINGER, INITIAL ENCOUNTER: Primary | ICD-10-CM

## 2024-03-13 PROCEDURE — 99213 OFFICE O/P EST LOW 20 MIN: CPT | Performed by: PHYSICIAN ASSISTANT

## 2024-03-13 NOTE — ED INITIAL ASSESSMENT (HPI)
Pt with laceration to 2nd finger of R hand. Pt stated that he accidentally cut himself with a clean razor knife at 0800 today. Pt denied pain. Pt stated tdap is 3-4 years old.

## 2024-03-13 NOTE — ED PROVIDER NOTES
Patient Seen in: [facility]      History     Chief Complaint   Patient presents with    Laceration/Abrasion     Stated Complaint: r hand cut    Subjective:   HPI    54-year-old male presenting for evaluation of an index finger laceration which occurred just prior to immediate care arrival.  Patient accidentally cut the finger with a razor while he was changing the blade.  Bleeding controlled at this time.  He did have a tetanus shot within the last 5 years  Bleeding.    Objective:   Past Medical History:   Diagnosis Date    Acute diverticulitis 01/2016    COVID-19 virus detected 01/02/2021    Hypercholesterolemia     Migraines     Osteoarthritis     Prediabetes 2010              Past Surgical History:   Procedure Laterality Date    COLONOSCOPY  2015    EGD  2015    INGUINAL HERNIA REPAIR Left 2009    INGUINAL HERNIA REPAIR Right 02/02/2021                Social History     Socioeconomic History    Marital status:     Number of children: 0   Occupational History    Occupation: [employer]     Comment: [employer]   Tobacco Use    Smoking status: Every Day     Packs/day: 1.00     Years: 30.00     Additional pack years: 0.00     Total pack years: 30.00     Types: Cigarettes    Smokeless tobacco: Never    Tobacco comments:     Pt state that he is ready to quit   Vaping Use    Vaping Use: Never used   Substance and Sexual Activity    Alcohol use: No     Alcohol/week: 0.0 standard drinks of alcohol    Drug use: No   Other Topics Concern    Caffeine Concern Yes              Review of Systems    Positive for stated complaint: r hand cut  Other systems are as noted in HPI.  Constitutional and vital signs reviewed.      All other systems reviewed and negative except as noted above.    Physical Exam     ED Triage Vitals [03/13/24 1116]   /86   Pulse 92   Resp 16   Temp 97.5 °F (36.4 °C)   Temp src Temporal   SpO2 96 %   O2 Device None (Room air)       Current:/86   Pulse 92   Temp 97.5  °F (36.4 °C) (Temporal)   Resp 16   SpO2 96%         Physical Exam  Vitals and nursing note reviewed.   Constitutional:       General: He is not in acute distress.  HENT:      Head: Normocephalic and atraumatic.      Right Ear: External ear normal.      Left Ear: External ear normal.      Nose: Nose normal.      Mouth/Throat:      Mouth: Mucous membranes are moist.   Eyes:      Extraocular Movements: Extraocular movements intact.      Pupils: Pupils are equal, round, and reactive to light.   Cardiovascular:      Rate and Rhythm: Normal rate.   Pulmonary:      Effort: Pulmonary effort is normal.   Abdominal:      General: Abdomen is flat.   Musculoskeletal:         General: Normal range of motion.        Hands:       Cervical back: Normal range of motion.      Comments: Laceration to the dorsal aspect of the right index finger.  Bleeding controlled at this time.   Skin:     General: Skin is warm.   Neurological:      General: No focal deficit present.      Mental Status: He is alert and oriented to person, place, and time.   Psychiatric:         Mood and Affect: Mood normal.         Behavior: Behavior normal.               ED Course   Labs Reviewed - No data to display     54-year-old male presenting with complaint of a laceration to the right index finger which occurred just prior to arrival.  Bleeding is controlled.    Shared decision with patient and wife-this wound is fairly superficial.  Choosing for Steri-Strips and immobilization over sutures.  PROCEDURE NOTE  The wound thoroughly irrigated using 0.9% normal saline  3 Steri-Strips were laid across the wound to approximate the wound edges.  A digital splint was placed over the finger for support.  Patient tolerated the procedure well   Wound care and return precautions discussed           MDM                                         Medical Decision Making      Disposition and Plan     Clinical Impression:  1. Laceration of blood vessel of right index finger,  initial encounter         Disposition:  Discharge  3/13/2024 11:42 am    Follow-up:  No follow-up provider specified.        Medications Prescribed:  Discharge Medication List as of 3/13/2024 11:44 AM

## (undated) DEVICE — VIOLET BRAIDED (POLYGLACTIN 910), SYNTHETIC ABSORBABLE SUTURE: Brand: COATED VICRYL

## (undated) DEVICE — DERMABOND LIQUID ADHESIVE

## (undated) DEVICE — PENROSE TUBING RADIOPAQUE: Brand: ARGYLE

## (undated) DEVICE — SPONGE: SPECIALTY PEANUT XR 100/CS: Brand: MEDICAL ACTION INDUSTRIES

## (undated) DEVICE — SOL  .9 1000ML BTL

## (undated) DEVICE — ENCORE® LATEX MICRO SIZE 8, STERILE LATEX POWDER-FREE SURGICAL GLOVE: Brand: ENCORE

## (undated) DEVICE — DRAPE SHEET TRANSVERSE LAP

## (undated) DEVICE — MINOR GENERAL: Brand: MEDLINE INDUSTRIES, INC.

## (undated) DEVICE — ENCORE® LATEX ACCLAIM SIZE 8, STERILE LATEX POWDER-FREE SURGICAL GLOVE: Brand: ENCORE

## (undated) DEVICE — UNDYED BRAIDED (POLYGLACTIN 910), SYNTHETIC ABSORBABLE SUTURE: Brand: COATED VICRYL

## (undated) NOTE — LETTER
12/11/2020          To Whom It May Concern:    [facility] is currently under my medical care and may not return to work at this time. Josué Hameed is scheduled for a Right Inguinal Hernia Repair on January 5, 2020.   He is unable to return to work EGT

## (undated) NOTE — LETTER
2/17/2021          To Whom It May Concern:    Jayda is currently under my medical care and may return to work on March 15, 2021. Activity is restricted as follows: No restrictions.   If you require additional information please contact our offi

## (undated) NOTE — MR AVS SNAPSHOT
[facility] [place] [address] [address]  [id number]  [phone]               Thank you for choosing us for your health care visit with Steven Nava MD.  We are glad to serve you and happy to provide you with this summary o These medications were sent to [facility] [address] [facility], [address] [place] [address].  [phone], [phone] [address] [address]     Phone:  [phone]    - Amoxicillin-Pot Clavulanate 500-125 MG Tabs            MyChart Women and lighter weight persons: limit to <= 1 drink* per day.                            Visit [facility] online at  [email or web address]

## (undated) NOTE — LETTER
1/20/2021          To Dr. Miriam Overton is currently under my medical care. Please be advised that he had his preoperative evaluation.  His exam and evaluation was unremarkable and should be cleared for his procedure with you pending his COVID t

## (undated) NOTE — LETTER
[address] [place] [id number] [phone]    20        To whom it may concern:    Ayesha Nugent ( [birth year]) was seen for evaluation of pelvic pain and possible hernia on

## (undated) NOTE — LETTER
Haroon Bowers Md  [address] [place] [id number]       11/27/21        Patient: Heike Wilburn   YOB: 1970   Date of Visit: 11/27/2021       Dear  Dr. Jaron Ibarra MD,      Thank you for referring Heike Wilburn to my practice.   P

## (undated) NOTE — MR AVS SNAPSHOT
[facility] - [place] [address] [address] [id number] [phone]               Thank you for choosing us for your health care visit with Radha Carnes MD.  We are glad to serve you and happy to provide you with this summary o schedule your appointment. Failure to obtain required authorization numbers can create reimbursement difficulties for you.         Has had diverticular disease sigmoid colon now only to able move bowells every days and with pain please evaluate for possible

## (undated) NOTE — LETTER
Patient: Jazzy Finn   YOB: 1970   Date of Visit: 2/17/2021     Dear  Dr. Mauro Holter, MD,    Thank you for referring Jazzy Finn to my practice. Please find my assessment and plan below.       Non-recurrent unilateral inguinal hernia without

## (undated) NOTE — LETTER
3/8/2021          To Whom It May Concern:    [facility] is currently under Dr. Reid gibson and is unable to return to work until March 15, 2021. Activity is restricted as follows: No restrictions.   If you require additional informat

## (undated) NOTE — LETTER
1/14/2021          To Whom It May Concern:    [facility] is currently under my medical care. Please be advised that the patient is unable to work until further notice due to his recent positive test for COVID and also his history of hernia.  He is alina

## (undated) NOTE — LETTER
10/7/2019    [facility]        [address]            Dear Benita Wilde,      [address] [facility] records indicate that you are due for an appointment for a Colonoscopy in November 2019, or shortly there after, with [facility]

## (undated) NOTE — LETTER
12/8/2020              [facility]        [address]         To Whom it may concern: This is to certify that [facility] had an appointment on 12/8/2020 at 2:20 PM with Vivian Gardner DO.   Off work due to m

## (undated) NOTE — LETTER
1/7/2021          To Whom It May Concern:    [facility] is currently under my medical care and may return to work on January 18, 2021. Activity is restricted as follows:  No restrictions from a hernia standpoint for return to work prior to surgery sche